# Patient Record
Sex: FEMALE | Race: BLACK OR AFRICAN AMERICAN
[De-identification: names, ages, dates, MRNs, and addresses within clinical notes are randomized per-mention and may not be internally consistent; named-entity substitution may affect disease eponyms.]

---

## 2017-01-01 ENCOUNTER — HOSPITAL ENCOUNTER (EMERGENCY)
Dept: HOSPITAL 17 - HOBED | Age: 32
LOS: 1 days | Discharge: HOME | End: 2017-01-02
Payer: MEDICAID

## 2017-01-01 DIAGNOSIS — R10.30: ICD-10-CM

## 2017-01-01 DIAGNOSIS — R06.09: ICD-10-CM

## 2017-01-01 DIAGNOSIS — O26.893: Primary | ICD-10-CM

## 2017-01-01 DIAGNOSIS — Z3A.33: ICD-10-CM

## 2017-01-01 LAB
COLOR UR: YELLOW
COMMENT (UR): (no result)
CULTURE IF INDICATED: (no result)
GLUCOSE UR STRIP-MCNC: (no result) MG/DL
HGB UR QL STRIP: (no result)
KETONES UR STRIP-MCNC: (no result) MG/DL
MUCOUS THREADS #/AREA URNS LPF: (no result) /LPF
NITRITE UR QL STRIP: (no result)
SP GR UR STRIP: 1.01 (ref 1–1.03)
SQUAMOUS #/AREA URNS HPF: 20 /HPF (ref 0–5)

## 2017-01-01 PROCEDURE — 59025 FETAL NON-STRESS TEST: CPT

## 2017-01-01 PROCEDURE — 81001 URINALYSIS AUTO W/SCOPE: CPT

## 2017-01-01 PROCEDURE — 99284 EMERGENCY DEPT VISIT MOD MDM: CPT

## 2017-01-01 NOTE — PD
HPI


Chief Complaint


Abdominal pain mild shortness of breath


Date Seen:  2017


Travel History


International Travel<30 Days:  No


Contact w/Intl Traveler<30Days:  No





History of Present Illness


HPI


Patient is a 33 week intrauterine pregnancy  who presents complaining of 

lower abdominal pain and mild shortness of breath.  Denies bleeding or ruptured 

membranes.  He is active heart rate tracing is reactive and no regular 

contractions


Para:  3


:  5





History


Obstetric History


Obstetric History


3 vag del





Allergies-Medications


(Allergen,Severity, Reaction):  


Coded Allergies:  


     Codeine (Verified  Allergy, Severe, 16)





Review of Systems


General / Constitutional:  No: Fever, Weight Gain, Chills, Other


Respiratory:  Short of Breath


Gastrointestinal:  Abdominal Pain





Physical Exam


Narrative


GENERAL: Well-nourished, well-developed patient.


SKIN: Warm and dry.


HEAD: Normocephalic and atraumatic.


EYES: No scleral icterus. No injection or drainage. 


ENT: No nasal drainage noted. Mucous membranes pink. Airway patent.


NECK: Supple, trachea midline. No JVD.


CARDIOVASCULAR: Regular rate and rhythm without murmurs, gallops, or rubs. 


RESPIRATORY: Breath sounds equal bilaterally. No accessory muscle use. pulse ox 

100 %


BREASTS: Bilateral exam showed no masses , no retractions, no nipple discharge.


ABDOMEN/GI: Abdomen soft, non-tender, bowel sounds present, no rebound, no 

guarding 


   Gravid to [-33] weeks size


   Fundal Height: [-32 cm]


GENITOURINARY: 


   External Genitalia: intact and normal in appearance


   BUS glands: [-]


   Cervix: [post-]


   Dilatation: [-closed]          


   Effacement: [thick-]          


   Station: [-3  


   Presentation: [-]        


   Membranes: [intact  ]


   Uterine Contractions: [no reg-]


FHT's: 


   Category: [1-]   


   Baseline: [-144]   


   Reactive: [yes-]   


   Variability: [-]  


   Decels: [-none]  


EXTREMITIES: No cyanosis or edema.


BACK: Nontender without obvious deformity. No CVA tenderness.


NEUROLOGICAL: Awake and alert. Motor and sensory grossly within normal limits. 

Five out of 5 muscle strength in all muscle groups. Normal speech.





Data


Data


Orders





 Urinalysis - C+S If Indicated (17 21:30)


Vital Signs (Adult) .ON ADMISSION (17 21:40)


^ Labor Status (17 21:40)


Lactated Ringer's 1000 Ml Inj (Lr 1000 M (17 21:40)


Fentanyl Inj (Fentanyl Inj) (17 21:45)


Labs





Laboratory Tests








Test 17





 21:18


 


Urine Color YELLOW 


 


Urine Turbidity HAZY 


 


Urine pH 6.5 


 


Urine Specific Gravity 1.012 


 


Urine Protein TRACE 


 


Urine Glucose (UA) NEG 


 


Urine Ketones NEG 


 


Urine Occult Blood NEG 


 


Urine Nitrite NEG 


 


Urine Bilirubin NEG 


 


Urine Urobilinogen LESS THAN 2.0 


 


Urine Leukocyte Esterase MOD 


 


Urine RBC 4 


 


Urine WBC 1 


 


Urine Squamous Epithelial 20 





Cells 


 


Urine Mucus FEW 


 


Microscopic Urinalysis Comment CULT NOT





 INDICATED











MDM


Interpretation(s)


33 week intrauterine pregnancy with lower abdominal pain and mild shortness of 

breath no bleeding or ruptured membranes.  She has a negative workup tonight 

urinalysis is negative.  The fetal heart rate tracing is reactive and there are 

no regular contractions appears to be in no significant pain.


Plan


Patient drove herself so no way to  give her any narcotic pain medication she'

ll drive herself home, she is to be discharged use Tylenol 1-2 by mouth 

liberally for pain increase her fluid intake heating pad and or hot bath bed 

rest being key


Diagnosis


Diagnosis:  


 Primary Impression:  


 Abdominal pain during pregnancy in third trimester


 Additional Impression:  


 Gestational dyspnea


Disposition:  01 DISCHARGE HOME


Condition:  Stable


Patient Instructions:  General Instructions,  Labor (ED), Fetal Movement

  (ED), Abdominal Pain in Pregnancy  (ED)


Departure Forms:  Tests/Procedures








Vamshi Mccormick II, MD 2017 23:19

## 2017-01-07 ENCOUNTER — HOSPITAL ENCOUNTER (EMERGENCY)
Dept: HOSPITAL 17 - HOBED | Age: 32
LOS: 1 days | Discharge: HOME | End: 2017-01-08
Payer: MEDICAID

## 2017-01-07 DIAGNOSIS — Z3A.29: ICD-10-CM

## 2017-01-07 DIAGNOSIS — O23.43: ICD-10-CM

## 2017-01-07 DIAGNOSIS — O34.219: ICD-10-CM

## 2017-01-07 DIAGNOSIS — O26.893: Primary | ICD-10-CM

## 2017-01-07 DIAGNOSIS — R10.30: ICD-10-CM

## 2017-01-07 PROCEDURE — 96361 HYDRATE IV INFUSION ADD-ON: CPT

## 2017-01-07 PROCEDURE — 99284 EMERGENCY DEPT VISIT MOD MDM: CPT

## 2017-01-07 PROCEDURE — 87086 URINE CULTURE/COLONY COUNT: CPT

## 2017-01-07 PROCEDURE — G0481 DRUG TEST DEF 8-14 CLASSES: HCPCS

## 2017-01-07 PROCEDURE — 81001 URINALYSIS AUTO W/SCOPE: CPT

## 2017-01-07 PROCEDURE — 59025 FETAL NON-STRESS TEST: CPT

## 2017-01-07 PROCEDURE — 80307 DRUG TEST PRSMV CHEM ANLYZR: CPT

## 2017-01-07 PROCEDURE — 96374 THER/PROPH/DIAG INJ IV PUSH: CPT

## 2017-01-07 PROCEDURE — 96372 THER/PROPH/DIAG INJ SC/IM: CPT

## 2017-01-08 LAB
AMPHETAMINE, URINE: (no result)
BARBITURATES, URINE: (no result)
COCAINE UR-MCNC: (no result) NG/ML
COLOR UR: (no result)
COMMENT (UR): (no result)
CULTURE IF INDICATED: (no result)
GLUCOSE UR STRIP-MCNC: (no result) MG/DL
HGB UR QL STRIP: (no result)
KETONES UR STRIP-MCNC: (no result) MG/DL
NITRITE UR QL STRIP: (no result)
SP GR UR STRIP: 1.01 (ref 1–1.03)
SQUAMOUS #/AREA URNS HPF: 9 /HPF (ref 0–5)

## 2017-01-08 NOTE — PD
HPI


Chief Complaint


Lower abdominal pain


Travel History


International Travel<30 Days:  No


Contact w/Intl Traveler<30Days:  No





History of Present Illness


HPI


Patient is 29 week intrauterine pregnancy previous  presents 

complaining of lower abdominal pain for several days.  Denies bleeding rupture 

the membranes.  Baby is active and fetal heart rate tracing is reactive.  There 

are no contractions


Para:  1


:  2





History


Past Surgical History


*** Narrative Surgical


Previous 





Social History


Alcohol Use:  No


Tobacco Use:  No


Substance Abuse:  No





Allergies-Medications


(Allergen,Severity, Reaction):  


Coded Allergies:  


     Codeine (Verified  Allergy, Severe, 16)


Home Meds


Active Scripts


Nitrofurantoin Monohydrate Macrocrystals (Macrobid)100 Mg [id number] Mg PO BID  #14 

CAP  Ref 0


   Prov:Vamshi Mccormick II, MD         17





Review of Systems


Gastrointestinal:  Abdominal Pain





Physical Exam


Narrative


GENERAL: Well-nourished, well-developed patient.obese


SKIN: Warm and dry.


HEAD: Normocephalic and atraumatic.


EYES: No scleral icterus. No injection or drainage. 


ENT: No nasal drainage noted. Mucous membranes pink. Airway patent.


NECK: Supple, trachea midline. No JVD.


CARDIOVASCULAR: Regular rate and rhythm without murmurs, gallops, or rubs. 


RESPIRATORY: Breath sounds equal bilaterally. No accessory muscle use.


BREASTS: Bilateral exam showed no masses , no retractions, no nipple discharge.


ABDOMEN/GI: Abdomen soft, non-tender, bowel sounds present, no rebound, no 

guarding 


   Gravid to [34-] weeks size


   Fundal Height: [34 cm-]


GENITOURINARY: 


   External Genitalia: intact and normal in appearance


   BUS glands: [-]


   Cervix: [-]post


   Dilatation: [0-]          


   Effacement: [-0]          


   Station: [-3]  


   Presentation: [-]        


   Membranes: [intact  ] amnio sure negative


   Uterine Contractions: [-none]


FHT's: 


   Category: [1-]   


   Baseline: [144-]   


   Reactive: [-yes]   


   Variability: [mod-]  


   Decels: [none]  


EXTREMITIES: No cyanosis or edema.


BACK: Nontender without obvious deformity. No CVA tenderness.


NEUROLOGICAL: Awake and alert. Motor and sensory grossly within normal limits. 

Five out of 5 muscle strength in all muscle groups. Normal speech.





Data


Data


Orders





 Vital Signs (Adult) .ON ADMISSION (17 00:02)


^ Labor Status (17 00:02)


Urinalysis - C+S If Indicated (17 00:02)


Lactated Ringer's 1000 Ml Inj (Lr 1000 M (17 00:02)


Ob/Psych Drug Screen, Urine (17 00:02)


Terbutaline Inj (Brethine Inj) (17 00:15)


Fentanyl Inj (Fentanyl Inj) (17 00:15)


Urine Culture (17 23:35)


Ur Bath Salts (17 23:35)


Ur Heroin (17 23:35)


Ur K2 Spice (17 23:35)


Ur Ecstasy (17 23:35)


Ur Methadone (17 23:35)


Phencyclidine Urine (Pcp) (17 23:35)


Fentanyl Inj (Fentanyl Inj) (17 01:00)


Labs





Laboratory Tests








Test 17





 23:35


 


Urine Color LIGHT-YELLOW 


 


Urine Turbidity HAZY 


 


Urine pH 7.0 


 


Urine Specific Gravity 1.010 


 


Urine Protein NEG 


 


Urine Glucose (UA) NEG 


 


Urine Ketones NEG 


 


Urine Occult Blood NEG 


 


Urine Nitrite NEG 


 


Urine Bilirubin NEG 


 


Urine Urobilinogen 2.0 


 


Urine Leukocyte Esterase LARGE 


 


Urine RBC 2 


 


Urine WBC 15 


 


Urine Squamous Epithelial 9 





Cells 


 


Microscopic Urinalysis Comment CULTURE





 INDICATED


 


Urine Opiates Screen NEG 


 


Urine Barbiturates Screen NEG 


 


Urine Amphetamines Screen NEG 


 


Urine Benzodiazepines Screen NEG 


 


Urine Cocaine Screen NEG 


 


Urine Cannabinoids Screen NEG 














 Date/Time Procedure Status





Source Growth 


 


 17 23:35 Urine Culture Received





Urine Clean Catch Pending 











MDM


Interpretation(s)


29 week intrauterine pregnancy previous  presents complaining pains no 

bleeding or rupture the membranes analysis consistent with UTI fetal heart rate 

tracing within normal limits no contractions


Plan


Have patient begin oral antibiotics and increase her oral fluid intake increase 

bedrest at home and return for any further or worsening problems


Diagnosis


Diagnosis:  


 Primary Impression:  


 Abdominal pain during pregnancy in third trimester


 Additional Impression:  


 UTI (urinary tract infection) during pregnancy


Disposition:   DISCHARGE HOME


Condition:  Stable


Scripts


Nitrofurantoin Monohydrate Macrocrystals (Macrobid)100 Mg [id number] Mg PO BID  #14 

CAP  Ref 0


   Prov:Vamshi Mccormick II, MD         17


Patient Instructions:  General Instructions


Departure Forms:  Tests/Procedures








Vamshi Mccormick II, MD 2017 09:32

## 2017-01-12 LAB
BATH SALTS (MDPV) UR: (no result)
ECSTASY (MDMA) UR: (no result)
HEROIN (6-ACETYLMORPHINE) UR: (no result)
K2 SPICE UR: (no result)
METHADONE UR QL SCN: (no result)
OXYCODONE (PERCODAN): (no result)
PCP UR-MCNC: (no result) UG/L

## 2017-03-09 ENCOUNTER — HOSPITAL ENCOUNTER (EMERGENCY)
Dept: HOSPITAL 17 - NEPC | Age: 32
Discharge: HOME | End: 2017-03-09
Payer: COMMERCIAL

## 2017-03-09 VITALS
OXYGEN SATURATION: 98 % | SYSTOLIC BLOOD PRESSURE: 131 MMHG | DIASTOLIC BLOOD PRESSURE: 70 MMHG | HEART RATE: 126 BPM | TEMPERATURE: 100.7 F | RESPIRATION RATE: 18 BRPM

## 2017-03-09 VITALS
DIASTOLIC BLOOD PRESSURE: 67 MMHG | RESPIRATION RATE: 18 BRPM | OXYGEN SATURATION: 98 % | HEART RATE: 100 BPM | SYSTOLIC BLOOD PRESSURE: 105 MMHG

## 2017-03-09 VITALS
HEART RATE: 102 BPM | TEMPERATURE: 99.4 F | DIASTOLIC BLOOD PRESSURE: 63 MMHG | OXYGEN SATURATION: 98 % | RESPIRATION RATE: 18 BRPM | SYSTOLIC BLOOD PRESSURE: 103 MMHG

## 2017-03-09 VITALS — WEIGHT: 198.42 LBS | HEIGHT: 62 IN | BODY MASS INDEX: 36.51 KG/M2

## 2017-03-09 VITALS
RESPIRATION RATE: 18 BRPM | SYSTOLIC BLOOD PRESSURE: 110 MMHG | DIASTOLIC BLOOD PRESSURE: 74 MMHG | OXYGEN SATURATION: 98 % | HEART RATE: 111 BPM

## 2017-03-09 DIAGNOSIS — N39.0: ICD-10-CM

## 2017-03-09 DIAGNOSIS — B96.89: ICD-10-CM

## 2017-03-09 DIAGNOSIS — J45.901: Primary | ICD-10-CM

## 2017-03-09 LAB
ANION GAP SERPL CALC-SCNC: 8 MEQ/L (ref 5–15)
BACTERIA #/AREA URNS HPF: (no result) /HPF
BASOPHILS # BLD AUTO: 0 TH/MM3 (ref 0–0.2)
BASOPHILS NFR BLD: 0.4 % (ref 0–2)
BUN SERPL-MCNC: 8 MG/DL (ref 7–18)
CHLORIDE SERPL-SCNC: 108 MEQ/L (ref 98–107)
COLOR UR: YELLOW
COMMENT (UR): (no result)
CULTURE IF INDICATED: (no result)
EOSINOPHIL # BLD: 0.1 TH/MM3 (ref 0–0.4)
EOSINOPHIL NFR BLD: 0.8 % (ref 0–4)
ERYTHROCYTE [DISTWIDTH] IN BLOOD BY AUTOMATED COUNT: 17.1 % (ref 11.6–17.2)
GFR SERPLBLD BASED ON 1.73 SQ M-ARVRAT: 90 ML/MIN (ref 89–?)
GLUCOSE UR STRIP-MCNC: (no result) MG/DL
HCO3 BLD-SCNC: 25.6 MEQ/L (ref 21–32)
HCT VFR BLD CALC: 35.9 % (ref 35–46)
HEMO FLAGS: (no result)
HGB UR QL STRIP: (no result)
HYALINE CASTS #/AREA URNS LPF: 1 /LPF
KETONES UR STRIP-MCNC: (no result) MG/DL
LYMPHOCYTES # BLD AUTO: 1.4 TH/MM3 (ref 1–4.8)
LYMPHOCYTES NFR BLD AUTO: 21.1 % (ref 9–44)
MCH RBC QN AUTO: 25.8 PG (ref 27–34)
MCHC RBC AUTO-ENTMCNC: 32.5 % (ref 32–36)
MCV RBC AUTO: 79.4 FL (ref 80–100)
MONOCYTES NFR BLD: 10.8 % (ref 0–8)
MUCOUS THREADS #/AREA URNS LPF: (no result) /LPF
NEUTROPHILS # BLD AUTO: 4.6 TH/MM3 (ref 1.8–7.7)
NEUTROPHILS NFR BLD AUTO: 66.9 % (ref 16–70)
NITRITE UR QL STRIP: (no result)
PLATELET # BLD: 273 TH/MM3 (ref 150–450)
POTASSIUM SERPL-SCNC: 3.7 MEQ/L (ref 3.5–5.1)
RBC # BLD AUTO: 4.52 MIL/MM3 (ref 4–5.3)
SODIUM SERPL-SCNC: 142 MEQ/L (ref 136–145)
SP GR UR STRIP: 1.03 (ref 1–1.03)
SQUAMOUS #/AREA URNS HPF: 5 /HPF (ref 0–5)
WBC # BLD AUTO: 6.9 TH/MM3 (ref 4–11)

## 2017-03-09 PROCEDURE — 87081 CULTURE SCREEN ONLY: CPT

## 2017-03-09 PROCEDURE — 84703 CHORIONIC GONADOTROPIN ASSAY: CPT

## 2017-03-09 PROCEDURE — 71010: CPT

## 2017-03-09 PROCEDURE — 87804 INFLUENZA ASSAY W/OPTIC: CPT

## 2017-03-09 PROCEDURE — 85025 COMPLETE CBC W/AUTO DIFF WBC: CPT

## 2017-03-09 PROCEDURE — 81001 URINALYSIS AUTO W/SCOPE: CPT

## 2017-03-09 PROCEDURE — 99283 EMERGENCY DEPT VISIT LOW MDM: CPT

## 2017-03-09 PROCEDURE — 80048 BASIC METABOLIC PNL TOTAL CA: CPT

## 2017-03-09 PROCEDURE — 96375 TX/PRO/DX INJ NEW DRUG ADDON: CPT

## 2017-03-09 PROCEDURE — 96361 HYDRATE IV INFUSION ADD-ON: CPT

## 2017-03-09 PROCEDURE — 87086 URINE CULTURE/COLONY COUNT: CPT

## 2017-03-09 PROCEDURE — 94664 DEMO&/EVAL PT USE INHALER: CPT

## 2017-03-09 PROCEDURE — 87880 STREP A ASSAY W/OPTIC: CPT

## 2017-03-09 PROCEDURE — 96374 THER/PROPH/DIAG INJ IV PUSH: CPT

## 2017-03-09 NOTE — RADRPT
EXAM DATE/TIME:  03/09/2017 13:40 

 

[facility] COMPARISON:     

No previous studies available for comparison.

 

                     

INDICATIONS :     

Patient has had flu like symptoms for three days. She has also been short of breath.

                     

 

MEDICAL HISTORY :     

None.          

 

SURGICAL HISTORY :     

None.   

 

ENCOUNTER:     

Initial                                        

 

ACUITY:     

3 days      

 

PAIN SCORE:     

0/10

 

LOCATION:      

chest 

 

FINDINGS:     

A single view of the chest demonstrates the lungs to be symmetrically aerated without evidence of mas
s, infiltrate or effusion.  The cardiomediastinal contours are unremarkable.  Osseous structures are 
intact.

 

CONCLUSION:     No acute disease.  

 

 

 

 Francisco Lopez MD on March 09, 2017 at 14:29           

Board Certified Radiologist.

 This report was verified electronically.

## 2017-03-09 NOTE — PD
HPI


Chief Complaint:  Cold / Flu Symptoms


Time Seen by Provider:  13:38


Travel History


International Travel<30 days:  No


Contact w/Intl Traveler<30days:  No


Traveled to known affect area:  No





History of Present Illness


HPI


31-year-old female with history of asthma presents to emergency department for 

evaluation of cough, chest congestion, fever, chills, sore throat, myalgias 

worsening over the last 3-4 days.  He also reports nausea, vomiting, diarrhea.  

States she's been unable to keep any food down today.  Denies any abdominal 

pain.  No chest pain or tightness.  No hematochezia or hematemesis.  No other 

symptoms to report.





PFSH


Past Medical History


Asthma:  Yes


Pregnant?:  Not Pregnant


LMP:  RECENT DELIVERY





Social History


Alcohol Use:  No


Tobacco Use:  No





Allergies-Medications


(Allergen,Severity, Reaction):  


Coded Allergies:  


     Codeine (Verified  Allergy, Severe, 3/9/17)


Reported Meds & Prescriptions





Reported Meds & Active Scripts


Active


Prednisone 50 Mg Tab 50 Mg PO DAILY 5 Days


Albuterol Neb (Albuterol Sulfate) 2.5 Mg/3 Ml Neb 2.5 Mg NEB Q4HR NEB PRN


Zofran Odt (Ondansetron Odt) 4 Mg Tab 4 Mg SL Q6HR PRN








Review of Systems


Except as stated in HPI:  all other systems reviewed are Neg





Physical Exam


Narrative


GENERAL: Well nourished female pt, in no acute distress


SKIN: Warm and dry.


HEAD: Atraumatic. Normocephalic. 


EYES: Pupils equal and round. No scleral icterus. No injection or drainage. 


ENT: No nasal bleeding or discharge. Erythematous pharynx without exudate.  

Mucous membranes pink and moist.


NECK: Trachea midline. No JVD. 


CARDIOVASCULAR: Tachycardic rate and rhythm.  


RESPIRATORY: No accessory muscle use. Diminished; inspiratory and expiratory 

wheeze to auscultation. Breath sounds equal bilaterally. 


GASTROINTESTINAL: Abdomen soft, non-tender, nondistended. Hepatic and splenic 

margins not palpable. 


MUSCULOSKELETAL: Extremities without clubbing, cyanosis, or edema. No obvious 

deformities. 


NEUROLOGICAL: Awake and alert. No obvious cranial nerve deficits.  Motor 

grossly within normal limits. Five out of 5 muscle strength in the arms and 

legs.  Normal speech.


PSYCHIATRIC: Appropriate mood and affect; insight and judgment normal.





Data


Data


Last Documented VS





Vital Signs








  Date Time  Temp Pulse Resp B/P Pulse Ox O2 Delivery O2 Flow Rate FiO2


 


3/9/17 16:30  100 18 105/67 98 Room Air  


 


3/9/17 14:35 99.4       








Orders





 Complete Blood Count With Diff (3/9/17 13:34)


Urinalysis - C+S If Indicated (3/9/17 13:34)


Chest, Single Ap (3/9/17 13:34)


Blood Glucose (3/9/17 13:34)


Ecg Monitoring (3/9/17 13:34)


Iv Access Insert/Monitor (3/9/17 13:34)


Oximetry (3/9/17 13:34)


Oxygen Administration (3/9/17 13:34)


Basic Metabolic Panel (Bmp) (3/9/17 13:34)


Sodium Chlor 0.9% 1000 Ml Inj (Ns 1000 M (3/9/17 13:45)


Ed Urine Pregnancytest Poc (3/9/17 13:34)


Acetaminophen (Tylenol) (3/9/17 13:45)


Influenzae A/B Antigen (3/9/17 13:34)


Methylprednisolone So Succ Inj (Solumedr (3/9/17 13:45)


Ondansetron Inj (Zofran Inj) (3/9/17 14:00)


Group A Rapid Strep Screen (3/9/17 13:59)


Ondansetron Inj (Zofran Inj) (3/9/17 13:59)


Albuterol-Ipratropium Neb (Duoneb Neb) (3/9/17 14:30)


Strep Culture (Group A) (3/9/17 13:55)


Urine Culture (3/9/17 14:35)





Labs





 Laboratory Tests








Test 3/9/17 3/9/17





 13:55 14:35


 


White Blood Count 6.9 TH/MM3 


 


Red Blood Count 4.52 MIL/MM3 


 


Hemoglobin 11.7 GM/DL 


 


Hematocrit 35.9 % 


 


Mean Corpuscular Volume 79.4 FL 


 


Mean Corpuscular Hemoglobin 25.8 PG 


 


Mean Corpuscular Hemoglobin 32.5 % 





Concent  


 


Red Cell Distribution Width 17.1 % 


 


Platelet Count 273 TH/MM3 


 


Mean Platelet Volume 8.4 FL 


 


Neutrophils (%) (Auto) 66.9 % 


 


Lymphocytes (%) (Auto) 21.1 % 


 


Monocytes (%) (Auto) 10.8 % 


 


Eosinophils (%) (Auto) 0.8 % 


 


Basophils (%) (Auto) 0.4 % 


 


Neutrophils # (Auto) 4.6 TH/MM3 


 


Lymphocytes # (Auto) 1.4 TH/MM3 


 


Monocytes # (Auto) 0.7 TH/MM3 


 


Eosinophils # (Auto) 0.1 TH/MM3 


 


Basophils # (Auto) 0.0 TH/MM3 


 


CBC Comment DIFF FINAL  


 


Differential Comment   


 


Sodium Level 142 MEQ/L 


 


Potassium Level 3.7 MEQ/L 


 


Chloride Level 108 MEQ/L 


 


Carbon Dioxide Level 25.6 MEQ/L 


 


Anion Gap 8 MEQ/L 


 


Blood Urea Nitrogen 8 MG/DL 


 


Creatinine 0.89 MG/DL 


 


Estimat Glomerular Filtration 90 ML/MIN 





Rate  


 


Random Glucose 84 MG/DL 


 


Calcium Level 8.4 MG/DL 


 


Urine Color  YELLOW 


 


Urine Turbidity  HAZY 


 


Urine pH  6.0 


 


Urine Specific Gravity  1.032 


 


Urine Protein  30 mg/dL


 


Urine Glucose (UA)  NEG mg/dL


 


Urine Ketones  NEG mg/dL


 


Urine Occult Blood  TRACE 


 


Urine Nitrite  NEG 


 


Urine Bilirubin  NEG 


 


Urine Urobilinogen  LESS THAN 2.0





  MG/DL


 


Urine Leukocyte Esterase  LARGE 


 


Urine RBC  7 /hpf


 


Urine WBC  23 /hpf


 


Urine Squamous Epithelial  5 /hpf





Cells  


 


Urine Bacteria  OCC /hpf


 


Urine Hyaline Casts  1 /lpf


 


Urine Mucus  MANY /lpf


 


Microscopic Urinalysis Comment  CATH-CULTURE





  IND











MDM


Medical Decision Making


Medical Screen Exam Complete:  Yes


Emergency Medical Condition:  Yes


Medical Record Reviewed:  Yes


Differential Diagnosis


influenza vs pneumonia vs gastroenteritis vs asthma exacerbation


Narrative Course


31-year-old female presents to emergency department for evaluation of flulike 

symptoms.  Lab work is without acute concern.  Urinalysis is with 30 proteinuria

, large leukocyte esterase, 7 RBC, 23 WBD, occasional bacteria; culture is 

indicated. Pt will be started on keflex po for UTI; she is given IVF and zofran 

and duo neb. Verbalized improvement in symptoms. She will be discharged to 

follow up with a primary care provider. She agrees to return with any acute 

worsening of symptoms





Diagnosis





 Primary Impression:  


 Flu-like symptoms


 Additional Impressions:  


 Asthma exacerbation


 UTI (urinary tract infection)


 Qualified Code:  N39.0 - Urinary tract infection without hematuria, site 

unspecified


Referrals:  


Primary Care Physician


Patient Instructions:  General Instructions, Influenza (DC)


Departure Forms:  Tests/Procedures, Work Release   Enter return to work date:  

Mar 13, 2017





***Additional Instructions:


Rest


Maintain adequate oral hydration


Follow up with your primary care provider


Tylenol or ibuprofen as directed on the package as needed for fever


Return to ED with acute worsening of symptoms


***Med/Other Pt SpecificInfo:  Prescription(s) given


Scripts


Cephalexin (Keflex)500 Mg [id number] Mg PO Q12H  7 Days  Ref 0


   Prov:Pauline Frankel         3/9/17 


Prednisone 50 Mg Tab50 Mg PO DAILY  5 Days  Ref 0


   Prov:Pauline Frankel         3/9/17 


Albuterol Neb 2.5 Mg/3 Ml Neb2.5 Mg NEB Q4HR NEB PRN (SHORTNESS OF BREATH) #60 

NEBULE  Ref 0


   Prov:Pauline Frankel         3/9/17 


Ondansetron Odt (Zofran Odt)4 Mg Tab4 Mg SL Q6HR PRN (Nausea/Vomiting) #15 TAB  

Ref 0


   Prov:Pauline Frankel         3/9/17


Disposition:  01 DISCHARGE HOME


Condition:  Stable








Pauline Frankel Mar 9, 2017 13:38

## 2018-03-23 ENCOUNTER — HOSPITAL ENCOUNTER (EMERGENCY)
Dept: HOSPITAL 17 - NEPD | Age: 33
Discharge: HOME | End: 2018-03-23
Payer: COMMERCIAL

## 2018-03-23 VITALS — OXYGEN SATURATION: 98 %

## 2018-03-23 VITALS
HEART RATE: 87 BPM | OXYGEN SATURATION: 100 % | TEMPERATURE: 98.2 F | DIASTOLIC BLOOD PRESSURE: 59 MMHG | RESPIRATION RATE: 16 BRPM | SYSTOLIC BLOOD PRESSURE: 126 MMHG

## 2018-03-23 DIAGNOSIS — O23.41: ICD-10-CM

## 2018-03-23 DIAGNOSIS — B96.89: ICD-10-CM

## 2018-03-23 DIAGNOSIS — Z3A.01: ICD-10-CM

## 2018-03-23 DIAGNOSIS — N76.0: ICD-10-CM

## 2018-03-23 DIAGNOSIS — J45.909: ICD-10-CM

## 2018-03-23 DIAGNOSIS — O23.591: Primary | ICD-10-CM

## 2018-03-23 DIAGNOSIS — O99.511: ICD-10-CM

## 2018-03-23 LAB
ALBUMIN SERPL-MCNC: 3.4 GM/DL (ref 3.4–5)
ALP SERPL-CCNC: 72 U/L (ref 45–117)
ALT SERPL-CCNC: 32 U/L (ref 10–53)
AST SERPL-CCNC: 15 U/L (ref 15–37)
BACTERIA #/AREA URNS HPF: (no result) /HPF
BASOPHILS # BLD AUTO: 0.1 TH/MM3 (ref 0–0.2)
BASOPHILS NFR BLD: 0.6 % (ref 0–2)
BILIRUB SERPL-MCNC: 0.6 MG/DL (ref 0.2–1)
BUN SERPL-MCNC: 5 MG/DL (ref 7–18)
CALCIUM SERPL-MCNC: 8.7 MG/DL (ref 8.5–10.1)
CHLORIDE SERPL-SCNC: 104 MEQ/L (ref 98–107)
COLOR UR: (no result)
CREAT SERPL-MCNC: 0.68 MG/DL (ref 0.5–1)
EOSINOPHIL # BLD: 0.2 TH/MM3 (ref 0–0.4)
EOSINOPHIL NFR BLD: 1.6 % (ref 0–4)
ERYTHROCYTE [DISTWIDTH] IN BLOOD BY AUTOMATED COUNT: 14.3 % (ref 11.6–17.2)
GFR SERPLBLD BASED ON 1.73 SQ M-ARVRAT: 121 ML/MIN (ref 89–?)
GLUCOSE SERPL-MCNC: 81 MG/DL (ref 74–106)
GLUCOSE UR STRIP-MCNC: (no result) MG/DL
HCO3 BLD-SCNC: 26.3 MEQ/L (ref 21–32)
HCT VFR BLD CALC: 38.2 % (ref 35–46)
HGB BLD-MCNC: 13.3 GM/DL (ref 11.6–15.3)
HGB UR QL STRIP: (no result)
INR PPP: 1 RATIO
KETONES UR STRIP-MCNC: (no result) MG/DL
LYMPHOCYTES # BLD AUTO: 2.5 TH/MM3 (ref 1–4.8)
LYMPHOCYTES NFR BLD AUTO: 21.2 % (ref 9–44)
MCH RBC QN AUTO: 30.4 PG (ref 27–34)
MCHC RBC AUTO-ENTMCNC: 34.7 % (ref 32–36)
MCV RBC AUTO: 87.5 FL (ref 80–100)
MONOCYTE #: 0.6 TH/MM3 (ref 0–0.9)
MONOCYTES NFR BLD: 4.8 % (ref 0–8)
NEUTROPHILS # BLD AUTO: 8.3 TH/MM3 (ref 1.8–7.7)
NEUTROPHILS NFR BLD AUTO: 71.8 % (ref 16–70)
NITRITE UR QL STRIP: (no result)
PLATELET # BLD: 297 TH/MM3 (ref 150–450)
PMV BLD AUTO: 8.2 FL (ref 7–11)
PROT SERPL-MCNC: 7.5 GM/DL (ref 6.4–8.2)
PROTHROMBIN TIME: 10 SEC (ref 9.8–11.6)
RBC # BLD AUTO: 4.36 MIL/MM3 (ref 4–5.3)
SODIUM SERPL-SCNC: 138 MEQ/L (ref 136–145)
SP GR UR STRIP: 1 (ref 1–1.03)
SQUAMOUS #/AREA URNS HPF: 17 /HPF (ref 0–5)
URINE LEUKOCYTE ESTERASE: (no result)
WBC # BLD AUTO: 11.6 TH/MM3 (ref 4–11)

## 2018-03-23 PROCEDURE — 81001 URINALYSIS AUTO W/SCOPE: CPT

## 2018-03-23 PROCEDURE — 99284 EMERGENCY DEPT VISIT MOD MDM: CPT

## 2018-03-23 PROCEDURE — 87591 N.GONORRHOEAE DNA AMP PROB: CPT

## 2018-03-23 PROCEDURE — 80053 COMPREHEN METABOLIC PANEL: CPT

## 2018-03-23 PROCEDURE — 87210 SMEAR WET MOUNT SALINE/INK: CPT

## 2018-03-23 PROCEDURE — 85730 THROMBOPLASTIN TIME PARTIAL: CPT

## 2018-03-23 PROCEDURE — 84702 CHORIONIC GONADOTROPIN TEST: CPT

## 2018-03-23 PROCEDURE — 86901 BLOOD TYPING SEROLOGIC RH(D): CPT

## 2018-03-23 PROCEDURE — 86900 BLOOD TYPING SEROLOGIC ABO: CPT

## 2018-03-23 PROCEDURE — 85025 COMPLETE CBC W/AUTO DIFF WBC: CPT

## 2018-03-23 PROCEDURE — 76700 US EXAM ABDOM COMPLETE: CPT

## 2018-03-23 PROCEDURE — 84703 CHORIONIC GONADOTROPIN ASSAY: CPT

## 2018-03-23 PROCEDURE — 87086 URINE CULTURE/COLONY COUNT: CPT

## 2018-03-23 PROCEDURE — 87491 CHLMYD TRACH DNA AMP PROBE: CPT

## 2018-03-23 PROCEDURE — 85610 PROTHROMBIN TIME: CPT

## 2018-03-23 NOTE — RADRPT
EXAM DATE/TIME:  2018 13:45 

 

[facility] COMPARISON:     

No previous studies available for comparison.

        

 

 

INDICATIONS :     

Pain with pregnancy.

                     

 

LAB(S):     

 

Beta-hC,858

                     

 

MEDICAL HISTORY :           

Asthma.

 

SURGICAL HISTORY :          

Gallstone removal.

 

ENCOUNTER:     

Initial

 

ACUITY:     

1 day

 

PAIN SCORE:     

6/10

 

LOCATION:     

Bilateral pelvis 

                     

MEASUREMENTS:     

 

UTERUS:                                  

11.6 x 5.4 x 6.8 cm cm 

 

ENDOMETRIAL STRIPE:      

13 mm

 

RIGHT OVARY:                      

3.4 x 1.5 x 1.7  cm 

 

LEFT OVARY:                         

3.4 x 2.5 x 3.1 cm  

 

CROWN RUMP LENGTH:     

0.6 cm = 6 WKS 3 DAYS

 

FHR:                                         

123 BPM

 

FINDINGS:     

 

UTERUS:     

6 week 3 day IUP otherwise unremarkable uterus

 

RIGHT OVARY:     

Ovary contains no mass or significant cystic lesion.  

 

LEFT OVARY:     

2.1 cm simple cyst

 

MISCELLANEOUS:     

No free fluid.  

 

CONCLUSION:     6 week 3 day IUP

 

 

 

 Nato Finney MD FACR on 2018 at 15:40           

Board Certified Radiologist.

 This report was verified electronically.

## 2018-03-23 NOTE — PD
HPI


Chief Complaint:  Pregnancy Related Problem


Time Seen by Provider:  12:28


Travel History


International Travel<30 days:  No


Contact w/Intl Traveler<30days:  No


Traveled to known affect area:  No





History of Present Illness


HPI


Patient 32-year-old female  presents emergency department early gestation 

probably 7-8 weeks gestational age by last menstrual.  For evaluation of low 

pelvic pain.  Patient denies any vaginal bleeding vaginal discharge or loss of 

fluid.  She has not had an ultrasound yet this pregnancy is not established 

with an OB/GYN.  Non-smoker nondrinker.  States the pain is cramping, mild to 

moderate, context as above, associated signs and symptoms as above





PFSH


Past Medical History


Asthma:  Yes


Pregnant?:  Pregnant


LMP:  18


:  5


Para:  4


Miscarriage:  1


:  0





Past Surgical History


Abdominal Surgery:  Yes (REMOVAL OF GALLSTONES)





Social History


Alcohol Use:  No


Tobacco Use:  No


Substance Use:  No





Allergies-Medications


(Allergen,Severity, Reaction):  


Coded Allergies:  


     codeine (Unverified  Allergy, Severe, 8/15/17)


Reported Meds & Prescriptions





Reported Meds & Active Scripts


Active


Prenatal One Tablet (Prenatal Vit #108/Iron/FA) 30 Mg Iron-800 Mcg Tablet 1 Tab 

PO DAILY 30 Days


Metrogel Vaginal Gel (Metronidazole Vaginal Gel) 0.75 % Gel 1 Appl VAGINAL HS 7 

Days


Macrobid (Nitrofurantoin Monoh/Nitrofur Macro) 100 Mg Cap 100 Mg PO BID 7 Days


Keflex (Cephalexin) 500 Mg Cap 500 Mg PO Q12H 7 Days


Prednisone 50 Mg Tab 50 Mg PO DAILY 5 Days


Albuterol Neb (Albuterol Sulfate) 2.5 Mg/3 Ml Neb 2.5 Mg NEB Q4HR NEB PRN


Zofran Odt (Ondansetron Odt) 4 Mg Tab 4 Mg SL Q6HR PRN








Review of Systems


Except as stated in HPI:  all other systems reviewed are Neg





Physical Exam


Narrative


GENERAL: Well-developed well-nourished, minimal discomfort


SKIN: Focused skin assessment warm/dry.


HEAD: Atraumatic. Normocephalic. 


EYES: Pupils equal and round. No scleral icterus. No injection or drainage. 


ENT: No nasal bleeding or discharge.  Mucous membranes pink and moist.


NECK: Trachea midline. No JVD. 


CARDIOVASCULAR: Regular rate and rhythm.  No murmur appreciated.


RESPIRATORY: No accessory muscle use. Clear to auscultation. Breath sounds 

equal bilaterally. 


GASTROINTESTINAL: Abdomen soft, non-tender, nondistended. Hepatic and splenic 

margins not palpable. .


GENITOURINARY: Exam performed with female nurse chaperone present all times, no 

vaginal lesion no vaginal bleeding, cervix is closed, no cervical motion 

tenderness no bimanual tenderness, there was a moderate amount of discharge 

which is white and smooth consistent with BV.


MUSCULOSKELETAL: No obvious deformities. No clubbing.  No cyanosis.  No edema. 


NEUROLOGICAL: Awake and alert. No obvious cranial nerve deficits.  Motor 

grossly within normal limits. Normal speech.


PSYCHIATRIC: Appropriate mood and affect; insight and judgment normal.





Data


Data


Last Documented VS





Vital Signs








  Date Time  Temp Pulse Resp B/P (MAP) Pulse Ox O2 Delivery O2 Flow Rate FiO2


 


3/23/18 13:08     98 Room Air  


 


3/23/18 10:17 98.2 87 16 126/59 (81)    








Orders





 Orders


Beta Hcg (Quant/Titer) (3/23/18 12:28)


Complete Blood Count With Diff (3/23/18 12:28)


Comprehensive Metabolic Panel (3/23/18 12:28)


Prothrombin Time / Inr (Pt) (3/23/18 12:28)


Act Partial Throm Time (Ptt) (3/23/18 12:28)


Urinalysis - C+S If Indicated (3/23/18 12:28)


Iv Access Insert/Monitor (3/23/18 12:28)


Ecg Monitoring (3/23/18 12:28)


Oximetry (3/23/18 12:28)


Sodium Chloride 0.9% Flush (Ns Flush) (3/23/18 12:30)


Ed Urine Pregnancytest Poc (3/23/18 12:28)


Wet Prep Profile (3/23/18 12:28)


Gc And Chlamydia Pcr (3/23/18 12:28)


Abo/Rh Blood Type (3/23/18 12:28)


Urine Culture (3/23/18 12:45)


Us Pelvis (Ques Preg/Ectopic) (3/23/18 13:13)


Ed Discharge Order (3/23/18 15:56)





Labs





Laboratory Tests








Test


  3/23/18


12:45 3/23/18


12:59


 


White Blood Count 11.6 TH/MM3  


 


Red Blood Count 4.36 MIL/MM3  


 


Hemoglobin 13.3 GM/DL  


 


Hematocrit 38.2 %  


 


Mean Corpuscular Volume 87.5 FL  


 


Mean Corpuscular Hemoglobin 30.4 PG  


 


Mean Corpuscular Hemoglobin


Concent 34.7 % 


  


 


 


Red Cell Distribution Width 14.3 %  


 


Platelet Count 297 TH/MM3  


 


Mean Platelet Volume 8.2 FL  


 


Neutrophils (%) (Auto) 71.8 %  


 


Lymphocytes (%) (Auto) 21.2 %  


 


Monocytes (%) (Auto) 4.8 %  


 


Eosinophils (%) (Auto) 1.6 %  


 


Basophils (%) (Auto) 0.6 %  


 


Neutrophils # (Auto) 8.3 TH/MM3  


 


Lymphocytes # (Auto) 2.5 TH/MM3  


 


Monocytes # (Auto) 0.6 TH/MM3  


 


Eosinophils # (Auto) 0.2 TH/MM3  


 


Basophils # (Auto) 0.1 TH/MM3  


 


CBC Comment DIFF FINAL  


 


Differential Comment   


 


Prothrombin Time 10.0 SEC  


 


Prothromb Time International


Ratio 1.0 RATIO 


  


 


 


Activated Partial


Thromboplast Time 25.3 SEC 


  


 


 


Urine Color LIGHT-YELLOW  


 


Urine Turbidity HAZY  


 


Urine pH 6.5  


 


Urine Specific Gravity 1.005  


 


Urine Protein NEG mg/dL  


 


Urine Glucose (UA) NEG mg/dL  


 


Urine Ketones NEG mg/dL  


 


Urine Occult Blood NEG  


 


Urine Nitrite NEG  


 


Urine Bilirubin NEG  


 


Urine Urobilinogen


  LESS THAN 2.0


MG/DL 


 


 


Urine Leukocyte Esterase LARGE  


 


Urine RBC 13 /hpf  


 


Urine WBC 10 /hpf  


 


Urine Squamous Epithelial


Cells 17 /hpf 


  


 


 


Urine Bacteria FEW /hpf  


 


Microscopic Urinalysis Comment


  CULTURE


INDICATED 


 


 


Blood Urea Nitrogen 5 MG/DL  


 


Creatinine 0.68 MG/DL  


 


Random Glucose 81 MG/DL  


 


Total Protein 7.5 GM/DL  


 


Albumin 3.4 GM/DL  


 


Calcium Level 8.7 MG/DL  


 


Alkaline Phosphatase 72 U/L  


 


Aspartate Amino Transf


(AST/SGOT) 15 U/L 


  


 


 


Alanine Aminotransferase


(ALT/SGPT) 32 U/L 


  


 


 


Total Bilirubin 0.6 MG/DL  


 


Sodium Level 138 MEQ/L  


 


Potassium Level 3.4 MEQ/L  


 


Chloride Level 104 MEQ/L  


 


Carbon Dioxide Level 26.3 MEQ/L  


 


Anion Gap 8 MEQ/L  


 


Estimat Glomerular Filtration


Rate 121 ML/MIN 


  


 


 


Human Chorionic Gonadotropin,


Quant 21984 MIU/ML 


  


 


 


Clue Cells (Wet Prep)  PRESENT 


 


Vaginal Trichomonas (Wet Prep)  NONE SEEN 


 


Vaginal Yeast (Wet Prep)  NONE SEEN 


 


Chlamydia trachomatis DNA


(PCR) 


  NOT DETECTED 


 


 


Neisseria gonorrhoeae DNA


(PCR) 


  NOT DETECTED 


 











MDM


Medical Decision Making


Medical Screen Exam Complete:  Yes


Emergency Medical Condition:  Yes


Differential Diagnosis


BV, CV, ectopic pregnancy, round ligament pain.


Narrative Course


Patient room to the emergency department, sign symptoms consistent with BV, she 

did have bacteria as well, nitrate negative urine however.  Patient had an 

ultrasound confirming intrauterine pregnancy at about 6 weeks 4 days.  She is 

more comfortable after Tylenol, patient reassured, will place on Macrobid and 

MetroGel.  Discussed follow-up with OB/GYN, prenatal vitamins, no smoking or 

drinking no drugs, discussed return to ED criteria.  She is stable for discharge





Diagnosis





 Primary Impression:  


 Bacterial vaginosis


 Additional Impression:  


 UTI (urinary tract infection)


***Med/Other Pt SpecificInfo:  Prescription(s) given


Scripts


Prenatal Vit #108/Iron/FA (Prenatal One Tablet) 30 Mg Iron-800 Mcg Tablet


1 TAB PO DAILY for 30 Days, #30 9 Refills


   Prov: Dom Carlson MD         3/23/18 


Metronidazole Vaginal Gel (Metrogel Vaginal Gel) 0.75 % Gel


1 APPL VAGINAL HS for Infection for 7 Days, #1 TUBE 0 Refills


   Prov: Dom Carlson MD         3/23/18 


Nitrofurantoin Monohydrate Macrocrystals (Macrobid) 100 Mg Cap


100 MG PO BID for Infection for 7 Days, #14 CAP 0 Refills


   Prov: Dom Carlson MD         3/23/18


Disposition:  01 DISCHARGE HOME


Condition:  Stable











Dom Carlson MD Mar 23, 2018 12:59